# Patient Record
Sex: MALE | Employment: OTHER
[De-identification: names, ages, dates, MRNs, and addresses within clinical notes are randomized per-mention and may not be internally consistent; named-entity substitution may affect disease eponyms.]

---

## 2017-04-04 PROBLEM — M54.2 NECK PAIN: Status: ACTIVE | Noted: 2017-04-04

## 2018-01-08 ENCOUNTER — HOSPITAL ENCOUNTER (OUTPATIENT)
Dept: GENERAL RADIOLOGY | Facility: CLINIC | Age: 73
Discharge: HOME | End: 2018-01-08
Payer: MEDICARE

## 2018-01-08 ENCOUNTER — HOSPITAL ENCOUNTER (OUTPATIENT)
Facility: CLINIC | Age: 73
Discharge: HOME | End: 2018-01-08
Payer: MEDICARE

## 2018-01-08 DIAGNOSIS — S40.011A CONTUSION OF RIGHT SCAPULA, INITIAL ENCOUNTER: ICD-10-CM

## 2018-01-08 PROCEDURE — 73010 X-RAY EXAM OF SHOULDER BLADE: CPT

## 2018-10-25 ENCOUNTER — HOSPITAL ENCOUNTER (OUTPATIENT)
Dept: MRI IMAGING | Facility: CLINIC | Age: 73
Discharge: HOME | End: 2018-10-27
Payer: MEDICARE

## 2018-10-25 DIAGNOSIS — H54.3 VISION LOSS, BILATERAL: ICD-10-CM

## 2018-10-25 LAB — POC CREATININE: 1 MG/DL (ref 0.6–1.4)

## 2018-10-25 PROCEDURE — 70553 MRI BRAIN STEM W/O & W/DYE: CPT

## 2018-10-25 PROCEDURE — 6360000004 HC RX CONTRAST MEDICATION: Performed by: FAMILY MEDICINE

## 2018-10-25 PROCEDURE — 82565 ASSAY OF CREATININE: CPT

## 2018-10-25 PROCEDURE — A9579 GAD-BASE MR CONTRAST NOS,1ML: HCPCS | Performed by: FAMILY MEDICINE

## 2018-10-25 RX ADMIN — GADOTERIDOL 15 ML: 279.3 INJECTION, SOLUTION INTRAVENOUS at 09:33

## 2018-10-31 PROBLEM — I67.2 CEREBRAL ATHEROSCLEROSIS: Status: ACTIVE | Noted: 2018-10-31

## 2018-10-31 PROBLEM — I65.29 STENOSIS OF CAROTID ARTERY: Status: ACTIVE | Noted: 2018-10-31

## 2018-12-07 ENCOUNTER — HOSPITAL ENCOUNTER (OUTPATIENT)
Age: 73
Setting detail: SPECIMEN
Discharge: HOME | End: 2018-12-07
Payer: MEDICARE

## 2018-12-07 DIAGNOSIS — E78.00 HYPERCHOLESTEROLEMIA: ICD-10-CM

## 2018-12-07 LAB
CHOLESTEROL/HDL RATIO: 2.3
CHOLESTEROL: 145 MG/DL
HDLC SERPL-MCNC: 62 MG/DL
LDL CHOLESTEROL: 69 MG/DL (ref 0–130)
TRIGL SERPL-MCNC: 68 MG/DL
VLDLC SERPL CALC-MCNC: NORMAL MG/DL (ref 1–30)

## 2019-07-19 ENCOUNTER — HOSPITAL ENCOUNTER (OUTPATIENT)
Dept: MRI IMAGING | Facility: CLINIC | Age: 74
Discharge: HOME | End: 2019-07-21
Payer: MEDICARE

## 2019-07-19 DIAGNOSIS — M54.32 SCIATICA OF LEFT SIDE: ICD-10-CM

## 2019-07-19 PROCEDURE — 72148 MRI LUMBAR SPINE W/O DYE: CPT

## 2020-09-14 PROBLEM — I79.8 OTHER DISORDERS OF ARTERIES, ARTERIOLES AND CAPILLARIES IN DISEASES CLASSIFIED ELSEWHERE (HCC): Status: ACTIVE | Noted: 2020-09-14

## 2023-04-17 ENCOUNTER — HOSPITAL ENCOUNTER (OUTPATIENT)
Age: 78
Setting detail: SPECIMEN
Discharge: HOME | End: 2023-04-17

## 2023-04-17 DIAGNOSIS — D50.9 IRON DEFICIENCY ANEMIA, UNSPECIFIED IRON DEFICIENCY ANEMIA TYPE: ICD-10-CM

## 2023-04-17 LAB
HCT VFR BLD AUTO: 39.6 % (ref 40.7–50.3)
HGB BLD-MCNC: 12.9 G/DL (ref 13–17)
MCH RBC QN AUTO: 31.2 PG (ref 25.2–33.5)
MCHC RBC AUTO-ENTMCNC: 32.6 G/DL (ref 28.4–34.8)
MCV RBC AUTO: 95.9 FL (ref 82.6–102.9)
NRBC AUTOMATED: 0 PER 100 WBC
PDW BLD-RTO: 14.4 % (ref 11.8–14.4)
PLATELET # BLD AUTO: 248 K/UL (ref 138–453)
PMV BLD AUTO: 9 FL (ref 8.1–13.5)
RBC # BLD: 4.13 M/UL (ref 4.21–5.77)
WBC # BLD AUTO: 5.6 K/UL (ref 3.5–11.3)

## 2023-05-08 PROBLEM — Z85.46 HISTORY OF PROSTATE CANCER: Status: ACTIVE | Noted: 2023-05-08

## 2023-05-08 PROBLEM — N39.42 URINARY INCONTINENCE WITHOUT SENSORY AWARENESS: Status: ACTIVE | Noted: 2023-05-08

## 2023-05-08 PROBLEM — R31.29 MICROHEMATURIA: Status: ACTIVE | Noted: 2023-05-08

## 2023-06-05 ENCOUNTER — PROCEDURE VISIT (OUTPATIENT)
Age: 78
End: 2023-06-05
Payer: MEDICARE

## 2023-06-05 DIAGNOSIS — R31.29 MICROHEMATURIA: ICD-10-CM

## 2023-06-05 DIAGNOSIS — R35.1 NOCTURIA: ICD-10-CM

## 2023-06-05 DIAGNOSIS — Z85.46 HISTORY OF PROSTATE CANCER: ICD-10-CM

## 2023-06-05 DIAGNOSIS — N39.42 URINARY INCONTINENCE WITHOUT SENSORY AWARENESS: Primary | ICD-10-CM

## 2023-06-05 PROCEDURE — 51797 INTRAABDOMINAL PRESSURE TEST: CPT | Performed by: SPECIALIST

## 2023-06-05 PROCEDURE — 51729 CYSTOMETROGRAM W/VP&UP: CPT | Performed by: SPECIALIST

## 2023-06-05 PROCEDURE — 52000 CYSTOURETHROSCOPY: CPT | Performed by: SPECIALIST

## 2023-06-05 PROCEDURE — 51784 ANAL/URINARY MUSCLE STUDY: CPT | Performed by: SPECIALIST

## 2023-06-05 PROCEDURE — 51741 ELECTRO-UROFLOWMETRY FIRST: CPT | Performed by: SPECIALIST

## 2023-06-05 RX ORDER — CEPHALEXIN 500 MG/1
500 CAPSULE ORAL 2 TIMES DAILY
Qty: 2 CAPSULE | Refills: 0 | Status: SHIPPED | OUTPATIENT
Start: 2023-06-05

## 2023-06-05 NOTE — PROGRESS NOTES
Trina Ballard, [address] [facility] Urology Office Progress Note    Patient:  Jayson Miller  YOB: 1945  Date: 6/5/2023    HISTORY OF PRESENT ILLNESS:   The patient is a 69 y.o. male  Patient's voiding diary shows a maximal bladder capacity of 275 mL (normal is 400 mL) and daily urine outputs of 1233 mL a day (normal is 1500 mL/d). Lower urinary tract symptoms: urgency, frequency, and nocturia, 2 times per night   Last AUA Symptom Score (QOL): 11 (4)  Today's AUA Symptom Score (QOL): 8 (5)    Summary of old records:   Urinary incontinence without sensory awareness: 2 mini-pads per day, 6/5/23 cysto/urodynamics: no obvious DO or MARIA L but external sphincter doesn't close; samples of Gemtesa (vibegron) 75 mg po qd; refer to PT  History of prostate cancer s/p 2001 RRP   5/15/23 VD: JQS=723 mL, MHD=9171 mL/d, TV=8 voids/d, IFY=364 mL/void, Nx2.33, NPi=49%, NUP=72 mL/hr     Additional History: none    Procedures Today:   Urodynamic Evaluation (6/5/23)  Straight catheterization for Postvoid Residual: 0 mL  Uroflow Study:  Amount Voided: 65 mL  Time to Max Flow: 15 sec  Maximum Flow Rate: 4.9 mL/sec  Average Flow Rate: 4.1 mL/sec  CMG with Voiding Pressure Study with intraabdominal probe:  First Sensation: 25 mL  Capacity: 277 mL  Interpretation: no detrusor overactivity   VLPP was >133 cm H20 at 250 mL  Leaking with Valsalva/coughing? No  UPP was 75 cm H20  Surface EMG Normal: yes    Cystoscopy Operative Note (6/5/23)  Surgeon: Karla Jack. Mee Ballard MD, FACS  Anesthesia: Urethral 2% Xylocaine   Indications: urinary incontinence without sensory awareness   Position: Dorsal Lithotomy    Findings:   The patient was prepped and draped in the usual sterile fashion. The flexible cystoscope was advanced through the urethra and into the bladder.   The bladder was thoroughly inspected and the following was noted:  Residual Urine: mild  Urethra: normal appearing urethra with no masses,

## 2023-06-27 ENCOUNTER — TELEPHONE (OUTPATIENT)
Age: 78
End: 2023-06-27

## 2023-06-27 NOTE — TELEPHONE ENCOUNTER
PT called into office today with an update, he was last seen Urodynamic Evaluation (6/5/23)  He was given samples of gemtesa and told to call back with results. He tried taking for 3 days only made his symptoms worse.  He did start pelvic floor therapy on 6/12/23 which is helping

## 2023-10-17 PROBLEM — S92.424D CLOSED NONDISPLACED FRACTURE OF DISTAL PHALANX OF RIGHT GREAT TOE WITH ROUTINE HEALING: Status: ACTIVE | Noted: 2022-08-29

## 2023-10-17 PROBLEM — M19.032 PRIMARY OSTEOARTHRITIS OF LEFT WRIST: Status: ACTIVE | Noted: 2023-05-01

## 2023-10-17 PROBLEM — M18.12 ARTHRITIS OF CARPOMETACARPAL (CMC) JOINT OF LEFT THUMB: Status: ACTIVE | Noted: 2023-08-01

## 2023-10-17 PROBLEM — J20.9 ACUTE BRONCHITIS: Status: ACTIVE | Noted: 2023-07-16

## 2023-10-17 PROBLEM — J02.9 VIRAL PHARYNGITIS: Status: ACTIVE | Noted: 2023-07-16

## 2023-10-18 ENCOUNTER — OFFICE VISIT (OUTPATIENT)
Age: 78
End: 2023-10-18
Payer: MEDICARE

## 2023-10-18 ENCOUNTER — HOSPITAL ENCOUNTER (OUTPATIENT)
Age: 78
Setting detail: SPECIMEN
Discharge: HOME | End: 2023-10-18

## 2023-10-18 VITALS — BODY MASS INDEX: 23.77 KG/M2 | HEIGHT: 70 IN | WEIGHT: 166 LBS

## 2023-10-18 DIAGNOSIS — N39.3 MALE STRESS INCONTINENCE: Primary | ICD-10-CM

## 2023-10-18 DIAGNOSIS — R53.83 OTHER FATIGUE: ICD-10-CM

## 2023-10-18 DIAGNOSIS — Z85.46 HISTORY OF PROSTATE CANCER: ICD-10-CM

## 2023-10-18 DIAGNOSIS — E55.9 VITAMIN D DEFICIENCY: ICD-10-CM

## 2023-10-18 DIAGNOSIS — R31.29 MICROHEMATURIA: ICD-10-CM

## 2023-10-18 DIAGNOSIS — E29.1 HYPOGONADISM IN MALE: ICD-10-CM

## 2023-10-18 DIAGNOSIS — E78.00 HYPERCHOLESTEROLEMIA: ICD-10-CM

## 2023-10-18 DIAGNOSIS — R35.1 NOCTURIA: ICD-10-CM

## 2023-10-18 DIAGNOSIS — E16.2 HYPOGLYCEMIA: ICD-10-CM

## 2023-10-18 LAB
25(OH)D3 SERPL-MCNC: 90.4 NG/ML
ALT SERPL-CCNC: 15 U/L (ref 5–41)
ANION GAP SERPL CALCULATED.3IONS-SCNC: 8 MMOL/L (ref 9–17)
AST SERPL-CCNC: 26 U/L
BASOPHILS # BLD: 0.05 K/UL (ref 0–0.2)
BASOPHILS NFR BLD: 1 % (ref 0–2)
BILIRUBIN, POC: NORMAL
BLOOD URINE, POC: NORMAL
BUN SERPL-MCNC: 18 MG/DL (ref 8–23)
CALCIUM SERPL-MCNC: 9.1 MG/DL (ref 8.6–10.4)
CHLORIDE SERPL-SCNC: 104 MMOL/L (ref 98–107)
CHOLEST SERPL-MCNC: 139 MG/DL
CHOLESTEROL/HDL RATIO: 2.2
CLARITY, POC: CLEAR
CO2 SERPL-SCNC: 26 MMOL/L (ref 20–31)
COLOR, POC: YELLOW
CREAT SERPL-MCNC: 1.1 MG/DL (ref 0.7–1.2)
EOSINOPHIL # BLD: 0.3 K/UL (ref 0–0.44)
EOSINOPHILS RELATIVE PERCENT: 5 % (ref 1–4)
ERYTHROCYTE [DISTWIDTH] IN BLOOD BY AUTOMATED COUNT: 14.6 % (ref 11.8–14.4)
EST. AVERAGE GLUCOSE BLD GHB EST-MCNC: 108 MG/DL
FOLATE SERPL-MCNC: >20 NG/ML
GFR SERPL CREATININE-BSD FRML MDRD: >60 ML/MIN/1.73M2
GLUCOSE P FAST SERPL-MCNC: 94 MG/DL (ref 70–99)
GLUCOSE URINE, POC: NORMAL
HBA1C MFR BLD: 5.4 % (ref 4–6)
HCT VFR BLD AUTO: 42.9 % (ref 40.7–50.3)
HDLC SERPL-MCNC: 63 MG/DL
HGB BLD-MCNC: 13.9 G/DL (ref 13–17)
IMM GRANULOCYTES # BLD AUTO: <0.03 K/UL (ref 0–0.3)
IMM GRANULOCYTES NFR BLD: 0 %
KETONES, POC: NORMAL
LDLC SERPL CALC-MCNC: 63 MG/DL (ref 0–130)
LEUKOCYTE EST, POC: NORMAL
LYMPHOCYTES NFR BLD: 2.24 K/UL (ref 1.1–3.7)
LYMPHOCYTES RELATIVE PERCENT: 40 % (ref 24–43)
MCH RBC QN AUTO: 31 PG (ref 25.2–33.5)
MCHC RBC AUTO-ENTMCNC: 32.4 G/DL (ref 28.4–34.8)
MCV RBC AUTO: 95.8 FL (ref 82.6–102.9)
MONOCYTES NFR BLD: 0.59 K/UL (ref 0.1–1.2)
MONOCYTES NFR BLD: 11 % (ref 3–12)
NEUTROPHILS NFR BLD: 43 % (ref 36–65)
NEUTS SEG NFR BLD: 2.35 K/UL (ref 1.5–8.1)
NITRITE, POC: NORMAL
NRBC BLD-RTO: 0 PER 100 WBC
PH, POC: NORMAL
PLATELET # BLD AUTO: 206 K/UL (ref 138–453)
PMV BLD AUTO: 9.4 FL (ref 8.1–13.5)
POTASSIUM SERPL-SCNC: 3.9 MMOL/L (ref 3.7–5.3)
PROTEIN, POC: NORMAL
PSA SERPL-MCNC: <0.02 NG/ML
RBC # BLD AUTO: 4.48 M/UL (ref 4.21–5.77)
RBC # BLD: ABNORMAL 10*6/UL
SHBG SERPL-SCNC: 107 NMOL/L (ref 11–80)
SODIUM SERPL-SCNC: 138 MMOL/L (ref 135–144)
SPECIFIC GRAVITY, POC: NORMAL
T4 FREE SERPL-MCNC: 1.3 NG/DL (ref 0.9–1.7)
TESTOST FREE MFR SERPL: 29.2 PG/ML (ref 47–244)
TESTOST SERPL-MCNC: 352 NG/DL (ref 220–1000)
TRIGL SERPL-MCNC: 67 MG/DL
TSH SERPL DL<=0.05 MIU/L-ACNC: 0.82 UIU/ML (ref 0.3–5)
UROBILINOGEN, POC: NORMAL
VIT B12 SERPL-MCNC: 1260 PG/ML (ref 232–1245)
WBC OTHER # BLD: 5.5 K/UL (ref 3.5–11.3)

## 2023-10-18 PROCEDURE — 99214 OFFICE O/P EST MOD 30 MIN: CPT | Performed by: SPECIALIST

## 2023-10-18 PROCEDURE — G8420 CALC BMI NORM PARAMETERS: HCPCS | Performed by: SPECIALIST

## 2023-10-18 PROCEDURE — 1036F TOBACCO NON-USER: CPT | Performed by: SPECIALIST

## 2023-10-18 PROCEDURE — G8427 DOCREV CUR MEDS BY ELIG CLIN: HCPCS | Performed by: SPECIALIST

## 2023-10-18 PROCEDURE — 1123F ACP DISCUSS/DSCN MKR DOCD: CPT | Performed by: SPECIALIST

## 2023-10-18 PROCEDURE — 81003 URINALYSIS AUTO W/O SCOPE: CPT | Performed by: SPECIALIST

## 2023-10-18 PROCEDURE — G8484 FLU IMMUNIZE NO ADMIN: HCPCS | Performed by: SPECIALIST

## 2023-10-24 ENCOUNTER — HOSPITAL ENCOUNTER (OUTPATIENT)
Dept: VASCULAR LAB | Age: 78
Discharge: HOME | End: 2023-10-26
Payer: MEDICARE

## 2023-10-24 DIAGNOSIS — I65.29 STENOSIS OF CAROTID ARTERY, UNSPECIFIED LATERALITY: ICD-10-CM

## 2023-10-24 DIAGNOSIS — I79.8 OTHER DISORDERS OF ARTERIES, ARTERIOLES AND CAPILLARIES IN DISEASES CLASSIFIED ELSEWHERE (HCC): ICD-10-CM

## 2023-10-24 LAB
VAS LEFT BULB EDV: 16.4 CM/S
VAS LEFT BULB PSV: 63.1 CM/S
VAS LEFT CCA DIST EDV: 16.4 CM/S
VAS LEFT CCA DIST PSV: 81.1 CM/S
VAS LEFT CCA PROX EDV: 21.6 CM/S
VAS LEFT CCA PROX PSV: 92.8 CM/S
VAS LEFT ECA EDV: 12.51 CM/S
VAS LEFT ECA PSV: 60.5 CM/S
VAS LEFT ICA DIST EDV: 34.7 CM/S
VAS LEFT ICA DIST PSV: 108.8 CM/S
VAS LEFT ICA PROX EDV: 21.6 CM/S
VAS LEFT ICA PROX PSV: 82.7 CM/S
VAS LEFT ICA/CCA PSV: 1.34 NO UNITS
VAS LEFT VERTEBRAL EDV: 9.95 CM/S
VAS LEFT VERTEBRAL PSV: 38.6 CM/S
VAS RIGHT BULB EDV: 18.5 CM/S
VAS RIGHT BULB PSV: 74.3 CM/S
VAS RIGHT CCA DIST EDV: 14.6 CM/S
VAS RIGHT CCA DIST PSV: 87.2 CM/S
VAS RIGHT CCA PROX EDV: 19.8 CM/S
VAS RIGHT CCA PROX PSV: 123.4 CM/S
VAS RIGHT ICA DIST EDV: 27.7 CM/S
VAS RIGHT ICA DIST PSV: 94 CM/S
VAS RIGHT ICA PROX EDV: 16 CM/S
VAS RIGHT ICA PROX PSV: 81 CM/S
VAS RIGHT ICA/CCA PSV: 1.1 NO UNITS
VAS RIGHT VERTEBRAL EDV: 6.93 CM/S
VAS RIGHT VERTEBRAL PSV: 41 CM/S

## 2023-10-24 PROCEDURE — 93880 EXTRACRANIAL BILAT STUDY: CPT

## 2023-10-24 PROCEDURE — 93880 EXTRACRANIAL BILAT STUDY: CPT | Performed by: SURGERY

## 2023-11-07 DIAGNOSIS — R35.1 NOCTURIA: ICD-10-CM

## 2023-11-07 DIAGNOSIS — N39.3 MALE STRESS INCONTINENCE: Primary | ICD-10-CM

## 2023-11-07 DIAGNOSIS — Z85.46 HISTORY OF PROSTATE CANCER: ICD-10-CM

## 2023-11-07 DIAGNOSIS — R31.29 MICROHEMATURIA: ICD-10-CM

## 2024-02-26 ENCOUNTER — HOSPITAL ENCOUNTER (OUTPATIENT)
Age: 79
Setting detail: SPECIMEN
Discharge: HOME | End: 2024-02-26

## 2024-02-26 DIAGNOSIS — E34.8 EXCESS ESTROGEN IN MALE: ICD-10-CM

## 2024-02-26 LAB — ESTRADIOL LEVEL: 10 PG/ML (ref 27–52)

## 2024-03-21 ENCOUNTER — HOSPITAL ENCOUNTER (OUTPATIENT)
Age: 79
Setting detail: SPECIMEN
Discharge: HOME | End: 2024-03-21

## 2024-03-21 DIAGNOSIS — E29.1 HYPOGONADISM MALE: ICD-10-CM

## 2024-03-21 DIAGNOSIS — R35.1 NOCTURIA: ICD-10-CM

## 2024-03-21 LAB
ALT SERPL-CCNC: 17 U/L (ref 10–50)
AST SERPL-CCNC: 30 U/L (ref 10–50)
BASOPHILS # BLD: 0.07 K/UL (ref 0–0.2)
BASOPHILS NFR BLD: 1 % (ref 0–2)
EOSINOPHIL # BLD: 0.25 K/UL (ref 0–0.44)
EOSINOPHILS RELATIVE PERCENT: 3 % (ref 1–4)
ERYTHROCYTE [DISTWIDTH] IN BLOOD BY AUTOMATED COUNT: 14.6 % (ref 11.8–14.4)
ESTRADIOL LEVEL: 44 PG/ML (ref 27–52)
HCT VFR BLD AUTO: 42.7 % (ref 40.7–50.3)
HGB BLD-MCNC: 13.9 G/DL (ref 13–17)
IMM GRANULOCYTES # BLD AUTO: <0.03 K/UL (ref 0–0.3)
IMM GRANULOCYTES NFR BLD: 0 %
LYMPHOCYTES NFR BLD: 3.12 K/UL (ref 1.1–3.7)
LYMPHOCYTES RELATIVE PERCENT: 43 % (ref 24–43)
MCH RBC QN AUTO: 29.3 PG (ref 25.2–33.5)
MCHC RBC AUTO-ENTMCNC: 32.6 G/DL (ref 28.4–34.8)
MCV RBC AUTO: 90.1 FL (ref 82.6–102.9)
MONOCYTES NFR BLD: 0.96 K/UL (ref 0.1–1.2)
MONOCYTES NFR BLD: 13 % (ref 3–12)
NEUTROPHILS NFR BLD: 40 % (ref 36–65)
NEUTS SEG NFR BLD: 2.88 K/UL (ref 1.5–8.1)
NRBC BLD-RTO: 0 PER 100 WBC
PLATELET # BLD AUTO: 257 K/UL (ref 138–453)
PMV BLD AUTO: 9 FL (ref 8.1–13.5)
PSA SERPL-MCNC: <0.03 NG/ML (ref 0–4)
RBC # BLD AUTO: 4.74 M/UL (ref 4.21–5.77)
RBC # BLD: ABNORMAL 10*6/UL
SHBG SERPL-SCNC: 81 NMOL/L (ref 19–76)
TESTOST FREE MFR SERPL: ABNORMAL PG/ML (ref 47–244)
TESTOST SERPL-MCNC: >1500 NG/DL (ref 193–740)
WBC OTHER # BLD: 7.3 K/UL (ref 3.5–11.3)

## 2024-06-03 ENCOUNTER — OFFICE VISIT (OUTPATIENT)
Age: 79
End: 2024-06-03
Payer: MEDICARE

## 2024-06-03 VITALS — WEIGHT: 165 LBS | BODY MASS INDEX: 23.62 KG/M2 | HEIGHT: 70 IN

## 2024-06-03 DIAGNOSIS — E29.1 HYPOGONADISM IN MALE: ICD-10-CM

## 2024-06-03 DIAGNOSIS — R31.29 MICROHEMATURIA: ICD-10-CM

## 2024-06-03 DIAGNOSIS — Z85.46 HISTORY OF PROSTATE CANCER: ICD-10-CM

## 2024-06-03 DIAGNOSIS — N39.3 MALE STRESS INCONTINENCE: Primary | ICD-10-CM

## 2024-06-03 PROCEDURE — G8420 CALC BMI NORM PARAMETERS: HCPCS | Performed by: SPECIALIST

## 2024-06-03 PROCEDURE — 99214 OFFICE O/P EST MOD 30 MIN: CPT | Performed by: SPECIALIST

## 2024-06-03 PROCEDURE — 1036F TOBACCO NON-USER: CPT | Performed by: SPECIALIST

## 2024-06-03 PROCEDURE — G8428 CUR MEDS NOT DOCUMENT: HCPCS | Performed by: SPECIALIST

## 2024-06-03 PROCEDURE — 1123F ACP DISCUSS/DSCN MKR DOCD: CPT | Performed by: SPECIALIST

## 2024-06-03 RX ORDER — KETOROLAC TROMETHAMINE 5 MG/ML
SOLUTION OPHTHALMIC
COMMUNITY

## 2024-06-03 RX ORDER — MAGNESIUM GLUCONATE 30 MG(550)
100 TABLET ORAL
COMMUNITY

## 2024-06-03 RX ORDER — TOBRAMYCIN AND DEXAMETHASONE 3; 1 MG/ML; MG/ML
SUSPENSION/ DROPS OPHTHALMIC
COMMUNITY
Start: 2024-04-02

## 2024-06-03 NOTE — PROGRESS NOTES
Alberto Conteh MD FACS    [facility] Urology Office Progress Note    Patient:  Laz Kurtz  YOB: 1945  Date: 6/3/2024    HISTORY OF PRESENT ILLNESS:   The patient is a 79 y.o. male  Patient's Stress urinary incontinence is much improved and nearly resolved after E-stim pelvic floor muscle therapy.    No evidence of prostate cancer recurrence s/p 2001 radical prostatectomy since PSA is 0.03.   Patient getting Depotestosterone injections for male hypogonadism and feels better with this Rx.  Lower urinary tract symptoms: urgency, frequency, decreased urinary stream, nocturia, 2 times per night, and incomplete emptying.   Last AUA Symptom Score (QOL): 8 (6)  Today's AUA Symptom Score (QOL): 7 (2)    Summary of old records:   Urinary incontinence without sensory awareness: 0 mini-pads per day, 6/5/23 cysto/urodynamics: no obvious DO or MARIA L but external sphincter doesn't close; Gemtesa (vibegron) 75 mg po qd helped his frequency; saw PT; referred to Dr. Haynes's PTs for E-stim  History of prostate cancer s/p 2001 RRP   5/15/23 VD: TET=989 mL, URR=7448 mL/d, TV=8 voids/d, KGO=445 mL/void, Nx2.33, NPi=49%, NUP=72 mL/hr  Microhematuria, small on 10/18/23 UA     Additional History: none    Procedures Today: N/A    Urinalysis today:  No results found for this visit on 06/03/24.    Last several PSA's:  Lab Results   Component Value Date    PSA <0.03 03/21/2024    PSA <0.02 10/18/2023    PSA 0.06 04/14/2022       Last total testosterone:  Lab Results   Component Value Date    TESTOSTERONE >1,500 (H) 03/21/2024       Last BUN and creatinine:  Lab Results   Component Value Date    BUN 18 10/18/2023     Lab Results   Component Value Date    CREATININE 1.1 10/18/2023       Last CBC:  Lab Results   Component Value Date    WBC 7.3 03/21/2024    HGB 13.9 03/21/2024    HCT 42.7 03/21/2024    MCV 90.1 03/21/2024     03/21/2024       Additional Lab/Culture results: none    Imaging Reviewed

## 2024-06-18 PROBLEM — F33.0 MAJOR DEPRESSIVE DISORDER, RECURRENT, MILD (HCC): Status: ACTIVE | Noted: 2024-06-18

## 2024-06-18 PROBLEM — F33.9 MAJOR DEPRESSIVE DISORDER, RECURRENT, UNSPECIFIED (HCC): Status: ACTIVE | Noted: 2024-06-18

## 2024-06-18 PROBLEM — F33.1 MAJOR DEPRESSIVE DISORDER, RECURRENT, MODERATE (HCC): Status: ACTIVE | Noted: 2024-06-18

## 2024-11-06 PROBLEM — F33.1 MAJOR DEPRESSIVE DISORDER, RECURRENT, MODERATE (HCC): Status: RESOLVED | Noted: 2024-06-18 | Resolved: 2024-11-06

## 2024-11-22 ENCOUNTER — HOSPITAL ENCOUNTER (OUTPATIENT)
Dept: GENERAL RADIOLOGY | Facility: CLINIC | Age: 79
Discharge: HOME | End: 2024-11-22
Payer: MEDICARE

## 2024-11-22 DIAGNOSIS — R05.2 SUBACUTE COUGH: ICD-10-CM

## 2024-11-22 PROCEDURE — 71046 X-RAY EXAM CHEST 2 VIEWS: CPT

## 2025-02-04 ENCOUNTER — HOSPITAL ENCOUNTER (OUTPATIENT)
Age: 80
Setting detail: SPECIMEN
Discharge: HOME | End: 2025-02-04

## 2025-02-04 DIAGNOSIS — R35.1 NOCTURIA: ICD-10-CM

## 2025-02-04 DIAGNOSIS — E29.1 HYPOGONADISM MALE: ICD-10-CM

## 2025-02-04 LAB
ALT SERPL-CCNC: 19 U/L (ref 10–50)
AST SERPL-CCNC: 35 U/L (ref 10–50)
BASOPHILS # BLD: 0.07 K/UL (ref 0–0.2)
BASOPHILS NFR BLD: 1 % (ref 0–2)
EOSINOPHIL # BLD: 0.21 K/UL (ref 0–0.44)
EOSINOPHILS RELATIVE PERCENT: 3 % (ref 1–4)
ERYTHROCYTE [DISTWIDTH] IN BLOOD BY AUTOMATED COUNT: 17.1 % (ref 11.8–14.4)
ESTRADIOL LEVEL: 12.9 PG/ML (ref 27–52)
HCT VFR BLD AUTO: 41.2 % (ref 40.7–50.3)
HGB BLD-MCNC: 12.9 G/DL (ref 13–17)
IMM GRANULOCYTES # BLD AUTO: <0.03 K/UL (ref 0–0.3)
IMM GRANULOCYTES NFR BLD: 0 %
LYMPHOCYTES NFR BLD: 3.06 K/UL (ref 1.1–3.7)
LYMPHOCYTES RELATIVE PERCENT: 51 % (ref 24–43)
MCH RBC QN AUTO: 27 PG (ref 25.2–33.5)
MCHC RBC AUTO-ENTMCNC: 31.3 G/DL (ref 28.4–34.8)
MCV RBC AUTO: 86.2 FL (ref 82.6–102.9)
MONOCYTES NFR BLD: 0.75 K/UL (ref 0.1–1.2)
MONOCYTES NFR BLD: 12 % (ref 3–12)
NEUTROPHILS NFR BLD: 33 % (ref 36–65)
NEUTS SEG NFR BLD: 2.02 K/UL (ref 1.5–8.1)
NRBC BLD-RTO: 0 PER 100 WBC
PLATELET # BLD AUTO: 274 K/UL (ref 138–453)
PMV BLD AUTO: 9.3 FL (ref 8.1–13.5)
PSA SERPL-MCNC: <0.03 NG/ML (ref 0–4)
RBC # BLD AUTO: 4.78 M/UL (ref 4.21–5.77)
RBC # BLD: ABNORMAL 10*6/UL
SHBG SERPL-SCNC: 98 NMOL/L (ref 19–76)
TESTOST FREE MFR SERPL: 89.2 PG/ML (ref 47–244)
TESTOST SERPL-MCNC: 877 NG/DL (ref 193–740)
WBC OTHER # BLD: 6.1 K/UL (ref 3.5–11.3)

## 2025-03-14 ENCOUNTER — APPOINTMENT (OUTPATIENT)
Dept: GENERAL RADIOLOGY | Facility: CLINIC | Age: 80
End: 2025-03-14
Payer: MEDICARE

## 2025-03-14 ENCOUNTER — HOSPITAL ENCOUNTER (EMERGENCY)
Facility: CLINIC | Age: 80
Discharge: HOME | End: 2025-03-14
Attending: STUDENT IN AN ORGANIZED HEALTH CARE EDUCATION/TRAINING PROGRAM
Payer: MEDICARE

## 2025-03-14 VITALS
SYSTOLIC BLOOD PRESSURE: 96 MMHG | TEMPERATURE: 98.2 F | WEIGHT: 155 LBS | BODY MASS INDEX: 22.19 KG/M2 | HEART RATE: 79 BPM | DIASTOLIC BLOOD PRESSURE: 59 MMHG | HEIGHT: 70 IN | OXYGEN SATURATION: 99 % | RESPIRATION RATE: 17 BRPM

## 2025-03-14 DIAGNOSIS — I48.91 NEW ONSET ATRIAL FIBRILLATION (HCC): Primary | ICD-10-CM

## 2025-03-14 LAB
ALBUMIN SERPL-MCNC: 3.9 G/DL (ref 3.5–5.2)
ALBUMIN/GLOB SERPL: 1.9 {RATIO}
ALP SERPL-CCNC: 57 U/L (ref 40–129)
ALT SERPL-CCNC: 16 U/L (ref 10–50)
ANION GAP SERPL CALCULATED.3IONS-SCNC: 9 MMOL/L (ref 9–16)
AST SERPL-CCNC: 36 U/L (ref 10–50)
BASOPHILS # BLD: 0 K/UL (ref 0–0.2)
BASOPHILS NFR BLD: 1 % (ref 0–2)
BILIRUB SERPL-MCNC: 0.8 MG/DL (ref 0–1.2)
BNP SERPL-MCNC: 454 PG/ML (ref 0–300)
BUN SERPL-MCNC: 11 MG/DL (ref 8–23)
CALCIUM SERPL-MCNC: 9 MG/DL (ref 8.6–10.4)
CHLORIDE SERPL-SCNC: 101 MMOL/L (ref 98–107)
CO2 SERPL-SCNC: 25 MMOL/L (ref 20–31)
CREAT SERPL-MCNC: 1.1 MG/DL (ref 0.7–1.2)
D DIMER PPP FEU-MCNC: 0.59 UG/ML FEU
EOSINOPHIL # BLD: 0.1 K/UL (ref 0–0.4)
EOSINOPHILS RELATIVE PERCENT: 3 % (ref 1–4)
ERYTHROCYTE [DISTWIDTH] IN BLOOD BY AUTOMATED COUNT: 18.3 % (ref 12.5–15.4)
GFR, ESTIMATED: 69 ML/MIN/1.73M2
GLUCOSE SERPL-MCNC: 157 MG/DL (ref 74–99)
HCT VFR BLD AUTO: 38.5 % (ref 41–53)
HGB BLD-MCNC: 13 G/DL (ref 13.5–17.5)
LYMPHOCYTES NFR BLD: 2 K/UL (ref 1–4.8)
LYMPHOCYTES RELATIVE PERCENT: 39 % (ref 24–44)
MAGNESIUM SERPL-MCNC: 2 MG/DL (ref 1.6–2.4)
MCH RBC QN AUTO: 28.7 PG (ref 26–34)
MCHC RBC AUTO-ENTMCNC: 33.8 G/DL (ref 31–37)
MCV RBC AUTO: 85 FL (ref 80–100)
MONOCYTES NFR BLD: 0.5 K/UL (ref 0.1–1.2)
MONOCYTES NFR BLD: 9 % (ref 2–11)
NEUTROPHILS NFR BLD: 48 % (ref 36–66)
NEUTS SEG NFR BLD: 2.5 K/UL (ref 1.8–7.7)
PLATELET # BLD AUTO: 246 K/UL (ref 140–450)
PMV BLD AUTO: 6.4 FL (ref 6–12)
POTASSIUM SERPL-SCNC: 4.3 MMOL/L (ref 3.7–5.3)
PROT SERPL-MCNC: 6 G/DL (ref 6.6–8.7)
RBC # BLD AUTO: 4.53 M/UL (ref 4.5–5.9)
SODIUM SERPL-SCNC: 135 MMOL/L (ref 136–145)
TROPONIN I SERPL HS-MCNC: 18 NG/L (ref 0–22)
TROPONIN I SERPL HS-MCNC: 19 NG/L (ref 0–22)
WBC OTHER # BLD: 5.1 K/UL (ref 3.5–11)

## 2025-03-14 PROCEDURE — 85379 FIBRIN DEGRADATION QUANT: CPT

## 2025-03-14 PROCEDURE — 96360 HYDRATION IV INFUSION INIT: CPT

## 2025-03-14 PROCEDURE — 83880 ASSAY OF NATRIURETIC PEPTIDE: CPT

## 2025-03-14 PROCEDURE — 84484 ASSAY OF TROPONIN QUANT: CPT

## 2025-03-14 PROCEDURE — 83735 ASSAY OF MAGNESIUM: CPT

## 2025-03-14 PROCEDURE — 36415 COLL VENOUS BLD VENIPUNCTURE: CPT

## 2025-03-14 PROCEDURE — 80053 COMPREHEN METABOLIC PANEL: CPT

## 2025-03-14 PROCEDURE — 71045 X-RAY EXAM CHEST 1 VIEW: CPT

## 2025-03-14 PROCEDURE — 99285 EMERGENCY DEPT VISIT HI MDM: CPT

## 2025-03-14 PROCEDURE — 2580000003 HC RX 258: Performed by: STUDENT IN AN ORGANIZED HEALTH CARE EDUCATION/TRAINING PROGRAM

## 2025-03-14 PROCEDURE — 85025 COMPLETE CBC W/AUTO DIFF WBC: CPT

## 2025-03-14 RX ORDER — METOPROLOL SUCCINATE 25 MG/1
25 TABLET, EXTENDED RELEASE ORAL DAILY
Qty: 30 TABLET | Refills: 0 | Status: SHIPPED | OUTPATIENT
Start: 2025-03-14 | End: 2025-04-13

## 2025-03-14 RX ORDER — 0.9 % SODIUM CHLORIDE 0.9 %
1000 INTRAVENOUS SOLUTION INTRAVENOUS ONCE
Status: COMPLETED | OUTPATIENT
Start: 2025-03-14 | End: 2025-03-14

## 2025-03-14 RX ADMIN — SODIUM CHLORIDE 1000 ML: 9 INJECTION, SOLUTION INTRAVENOUS at 08:53

## 2025-03-14 ASSESSMENT — PAIN - FUNCTIONAL ASSESSMENT
PAIN_FUNCTIONAL_ASSESSMENT: NONE - DENIES PAIN

## 2025-03-14 ASSESSMENT — ENCOUNTER SYMPTOMS
VOMITING: 0
ABDOMINAL PAIN: 0
COUGH: 0
SHORTNESS OF BREATH: 0
NAUSEA: 0

## 2025-03-14 NOTE — DISCHARGE INSTRUCTIONS
We evaluated you for your symptoms.  We found that you were in a new heart rhythm called atrial fibrillation.  We discussed this with cardiology.  Stop taking your lisinopril and start taking metoprolol succinate 25 mg daily.  Additionally stop taking aspirin every day and start taking Eliquis 5 mg twice daily.  This is a blood thinner.  You need to monitor for any signs of bleeding.  If you have any trauma or falls you do need to be evaluated as you are at a higher bleeding risk.    Please follow close with your primary doctor.    Please follow-up with the emergency cardiology clinic as soon as possible, call them today and discussed that you need an appointment.    Return to the emergency department if you develop any worsening or concerning symptoms.

## 2025-03-14 NOTE — ED PROVIDER NOTES
6.0 (*)     All other components within normal limits   BRAIN NATRIURETIC PEPTIDE - Abnormal; Notable for the following components:    NT Pro- (*)     All other components within normal limits   TROPONIN   MAGNESIUM   D-DIMER, QUANTITATIVE   TROPONIN       All other labs were within normal range or not returned as of this dictation.    RADIOLOGY:  XR CHEST PORTABLE   Final Result   1.  No acute abnormality.             EKG:  EKG showing A-fib, rate controlled 85, normal axis, no acute ST or T wave changes, abnormal EKG, no previous EKGs to compare to      ED COURSE     ED Course as of 03/14/25 1129   Fri Mar 14, 2025   0914 WBC: 5.1 [AB]   0914 Hemoglobin Quant(!): 13.0 [AB]   0915 D-Dimer, Quant: 0.59  Negative [AB]   0935 Creatinine: 1.1 [AB]   0935 Magnesium: 2.0 [AB]   0935 Troponin, High Sensitivity: 18  No priors will trend [AB]   0935 NT Pro-BNP(!): 454 [AB]   1003 XR CHEST PORTABLE  IMPRESSION:  1.  No acute abnormality.      [AB]   1008 Troponin, High Sensitivity: 19  Stable [AB]   1009 Patient remains rate controlled, will discuss with cardiology [AB]   1039 Patient reevaluated, resting comfortably, heart rate in the 70s, still in A-fib.  Updated on workup here thus far.  Still awaiting recommendations from cardiology. [AB]   1107 Did speak with Dr. Moya with cardiology.  Recommending that patient stop his lisinopril and switch to metoprolol succinate 25 mg daily.  Additionally would like patient to stop aspirin and start taking Eliquis 5 mg twice daily.  Would like patient to follow-up in the emergency health cardiology clinic.  Discussed this with patient, in agreement.  Will discharge at this time with close outpatient follow-up with cardiology as well as primary doctor.  Given strict return precautions.  Patient expressed understanding, agreed with plan. [AB]      ED Course User Index  [AB] Neli Thornton DO       CONSULTS:  IP CONSULT TO CARDIOLOGY    PROCEDURES:  Procedures    Critical

## 2025-03-16 LAB
EKG ATRIAL RATE: 69 BPM
EKG Q-T INTERVAL: 332 MS
EKG QRS DURATION: 82 MS
EKG QTC CALCULATION (BAZETT): 395 MS
EKG R AXIS: 63 DEGREES
EKG T AXIS: 62 DEGREES
EKG VENTRICULAR RATE: 85 BPM

## 2025-03-17 ENCOUNTER — CARE COORDINATION (OUTPATIENT)
Dept: CARE COORDINATION | Age: 80
End: 2025-03-17

## 2025-03-17 NOTE — CARE COORDINATION
Follow Up:   Plan for next ACM outreach in approximately 1 week to complete:  - outreach attempt to offer care management services.   Patient  is agreeable to this plan.

## 2025-03-26 ENCOUNTER — CARE COORDINATION (OUTPATIENT)
Dept: CARE COORDINATION | Age: 80
End: 2025-03-26

## 2025-03-26 NOTE — CARE COORDINATION
Ambulatory Care Coordination Note     3/26/2025 1:51 PM     Patient Current Location:  Home: [address] [place] [address] [place] [address]     ACM contacted the patient by telephone. Verified name and  with patient as identifiers.      ACM: Calvin Gabriel RN     Challenges to be reviewed by the provider   Additional needs identified to be addressed with provider No  none               Method of communication with provider: staff message.    Utilization: Has the patient been seen in the ED since your last call? no    Care Summary Note:     Writer phoned Patient for an update.  Patient states he is feeling well.  He denies chest pain/palpitations and shortness of breath.  He denies symptoms today.  Patient states he is waiting for his appointment with the Cardiologist for further recommendations.  He states he has all of his medications and is taking them as needed.  He is aware of all of his upcoming appointments.      Patient denies need for Writer's assistance at this time.  He states he has Writer's phone number if he has future questions or concerns.       PCP/Specialist follow up:   Future Appointments         Provider Specialty Dept Phone    2025 8:00 AM Vicente Bucio MD Family Medicine [phone]    2025 8:15 AM Debby Moya MD Cardiology [phone]    4/3/2025 3:00 PM Vicente Bucio MD Family Medicine [phone]    4/10/2025 8:10 AM Alberto Conteh MD Urology [phone]    2025 8:00 AM Vicente Bucio MD Family Medicine [phone]    2025 8:00 AM Vicente Bucio MD Family Medicine [phone]            Follow Up:   No further Ambulatory Care Management follow-up scheduled at this time.  Patient  has Ambulatory Care Manager's contact information for any further questions, concerns or needs.

## 2025-04-02 ENCOUNTER — OFFICE VISIT (OUTPATIENT)
Age: 80
End: 2025-04-02
Payer: MEDICARE

## 2025-04-02 VITALS
SYSTOLIC BLOOD PRESSURE: 166 MMHG | HEART RATE: 72 BPM | OXYGEN SATURATION: 97 % | WEIGHT: 153 LBS | BODY MASS INDEX: 21.95 KG/M2 | DIASTOLIC BLOOD PRESSURE: 89 MMHG

## 2025-04-02 DIAGNOSIS — I10 PRIMARY HYPERTENSION: ICD-10-CM

## 2025-04-02 DIAGNOSIS — I20.9 ANGINA PECTORIS: ICD-10-CM

## 2025-04-02 DIAGNOSIS — I48.0 PAROXYSMAL ATRIAL FIBRILLATION (HCC): Primary | ICD-10-CM

## 2025-04-02 DIAGNOSIS — E78.5 DYSLIPIDEMIA: ICD-10-CM

## 2025-04-02 DIAGNOSIS — R07.9 CHEST PAIN, UNSPECIFIED TYPE: ICD-10-CM

## 2025-04-02 DIAGNOSIS — I42.9 CARDIOMYOPATHY, UNSPECIFIED TYPE (HCC): ICD-10-CM

## 2025-04-02 DIAGNOSIS — R07.89 CHEST DISCOMFORT: ICD-10-CM

## 2025-04-02 PROCEDURE — 93000 ELECTROCARDIOGRAM COMPLETE: CPT | Performed by: INTERNAL MEDICINE

## 2025-04-02 PROCEDURE — 1036F TOBACCO NON-USER: CPT | Performed by: INTERNAL MEDICINE

## 2025-04-02 PROCEDURE — 3079F DIAST BP 80-89 MM HG: CPT | Performed by: INTERNAL MEDICINE

## 2025-04-02 PROCEDURE — G8427 DOCREV CUR MEDS BY ELIG CLIN: HCPCS | Performed by: INTERNAL MEDICINE

## 2025-04-02 PROCEDURE — 1123F ACP DISCUSS/DSCN MKR DOCD: CPT | Performed by: INTERNAL MEDICINE

## 2025-04-02 PROCEDURE — G8420 CALC BMI NORM PARAMETERS: HCPCS | Performed by: INTERNAL MEDICINE

## 2025-04-02 PROCEDURE — 3077F SYST BP >= 140 MM HG: CPT | Performed by: INTERNAL MEDICINE

## 2025-04-02 PROCEDURE — 1159F MED LIST DOCD IN RCRD: CPT | Performed by: INTERNAL MEDICINE

## 2025-04-02 PROCEDURE — 1160F RVW MEDS BY RX/DR IN RCRD: CPT | Performed by: INTERNAL MEDICINE

## 2025-04-02 PROCEDURE — 99205 OFFICE O/P NEW HI 60 MIN: CPT | Performed by: INTERNAL MEDICINE

## 2025-04-02 RX ORDER — REGADENOSON 0.08 MG/ML
0.4 INJECTION, SOLUTION INTRAVENOUS
OUTPATIENT
Start: 2025-04-02

## 2025-04-02 NOTE — PROGRESS NOTES
Cardiology Office Consultation           CC: Patient is here to establish cardiac care for chest discomfort and atrial fibrillation.    HPI:  This is a 79-year-old gentleman with history of dyslipidemia, essential hypertension, prostate cancer, iron deficiency anemia, generalized osteoarthritis, depression/anxiety and GERD presented to emergency room with complains of headache, chest discomfort and palpitations that woke him up at 3 AM.  EKG showed atrial fibrillation and he was started on metoprolol for heart rate control and apixaban for anticoagulation and is referred to cardiology for further workup and management.    EKG today shows that he is back in sinus rhythm.  We have discussed the importance of rate control and anticoagulation with atrial fibrillation.  We have requested patient to keep the logbook of blood pressure and heart rate.  We also advised precaution with fall etc. as he is now on a blood thinner.    He underwent echocardiogram in 2018 which showed EF 45% however stress test was unremarkable.  Since patient has chest discomfort and he has mild carotid artery disease suggesting he may have coronary artery disease as well therefore we will perform Lexiscan nuclear stress test and obtain an echocardiogram.    He denies any nicotine, alcohol or drug abuse.  He states that he does not have COPD either.  His father has history of strokes x 2.    Past Medical:  Past Medical History:   Diagnosis Date    Anxiety     Atrial fibrillation (HCC)     Cancer (HCC)     Prostate    COPD (chronic obstructive pulmonary disease) (HCC) 05/11/2015    Hyperlipidemia     Hypertension        Past Surgical:  Past Surgical History:   Procedure Laterality Date    COLONOSCOPY  2010, 2/22/16    LUMBAR FUSION  02/21/2023    L4-5    PROSTATE SURGERY  01/01/2001       Family History:  Family History   Problem Relation Age of Onset    Heart Disease Mother     Heart Disease Father     Stroke Father        Social

## 2025-04-10 ENCOUNTER — OFFICE VISIT (OUTPATIENT)
Age: 80
End: 2025-04-10
Payer: MEDICARE

## 2025-04-10 VITALS — WEIGHT: 153 LBS | BODY MASS INDEX: 21.9 KG/M2 | HEIGHT: 70 IN

## 2025-04-10 DIAGNOSIS — R35.0 FREQUENCY OF MICTURITION: ICD-10-CM

## 2025-04-10 DIAGNOSIS — Z85.46 HISTORY OF PROSTATE CANCER: ICD-10-CM

## 2025-04-10 DIAGNOSIS — N39.3 MALE STRESS INCONTINENCE: Primary | ICD-10-CM

## 2025-04-10 DIAGNOSIS — E29.1 HYPOGONADISM IN MALE: ICD-10-CM

## 2025-04-10 DIAGNOSIS — R31.29 MICROHEMATURIA: ICD-10-CM

## 2025-04-10 LAB
BILIRUBIN, POC: NORMAL
BLOOD URINE, POC: NORMAL
CLARITY, POC: CLEAR
COLOR, POC: YELLOW
GLUCOSE URINE, POC: NORMAL MG/DL
KETONES, POC: NORMAL
LEUKOCYTE EST, POC: NORMAL
NITRITE, POC: NORMAL
PH, POC: NORMAL
PROTEIN, POC: NORMAL MG/DL
SPECIFIC GRAVITY, POC: NORMAL
UROBILINOGEN, POC: NORMAL

## 2025-04-10 PROCEDURE — 81003 URINALYSIS AUTO W/O SCOPE: CPT | Performed by: SPECIALIST

## 2025-04-10 PROCEDURE — 1123F ACP DISCUSS/DSCN MKR DOCD: CPT | Performed by: SPECIALIST

## 2025-04-10 PROCEDURE — 99214 OFFICE O/P EST MOD 30 MIN: CPT | Performed by: SPECIALIST

## 2025-04-10 PROCEDURE — G8420 CALC BMI NORM PARAMETERS: HCPCS | Performed by: SPECIALIST

## 2025-04-10 PROCEDURE — G8428 CUR MEDS NOT DOCUMENT: HCPCS | Performed by: SPECIALIST

## 2025-04-10 PROCEDURE — 1036F TOBACCO NON-USER: CPT | Performed by: SPECIALIST

## 2025-04-10 NOTE — PROGRESS NOTES
Alberto Conteh MD FACS    [facility] Urology Office Progress Note    Patient:  Laz Kurtz  YOB: 1945  Date: 4/10/2025    HISTORY OF PRESENT ILLNESS:   The patient is a 79 y.o. male  No evidence of prostate cancer recurrence s/p 2001 open radical prostatectomy since PSA is <0.03.  Will repeat a PSA in 12 months.   Patient's Stress urinary incontinence is much better after seeing PT for pelvic floor muscle rehabilitation.  Patient getting Testosterone cypionate IM injections every 3 weeks for Testosterone deficiency.  Patient saw improvement in his urgency and frequency with Gemtesa (vibegron) 75 mg po qd for OAB.  Will send to [facility] pharmacy to see if patient qualifies for a free year's supply.   If not, will try Solifenacin (Vesicare) 5 mg po qd for OAB symptoms      Lower urinary tract symptoms: urgency, frequency, hesitancy, nocturia, 2 times per night, and incomplete emptying    Last AUA Symptom Score (QOL): 7 (2)  Today's AUA Symptom Score (QOL): 9 (3)    Summary of old records:   Urinary incontinence without sensory awareness: 0 mini-pads per day, 6/5/23 cysto/urodynamics: no obvious DO or MARIA L but external sphincter doesn't close; Gemtesa (vibegron) 75 mg po qd helped his frequency; saw PT; better with E-stim  History of prostate cancer s/p 2001 RRP   5/15/23 VD: IRX=586 mL, SXC=1268 mL/d, TV=8 voids/d, ULV=211 mL/void, Nx2.33, NPi=49%, NUP=72 mL/hr  Microhematuria, small on 10/18/23 UA   Frequency: better with Gemtesa 75 mg qd, too expensive, check GoodRx or switch to Solifenacin (Vesicare) 5 mg po qd 4/10/25    Additional History: none    Procedures Today: N/A    Urinalysis today:  Results for orders placed or performed in visit on 04/10/25   POCT Urinalysis No Micro (Auto)   Result Value Ref Range    Color, UA yellow     Clarity, UA clear     Glucose, UA POC neg mg/dL    Bilirubin, UA      Ketones, UA      Spec Grav, UA      Blood, UA POC small     pH, UA

## 2025-04-16 ENCOUNTER — HOSPITAL ENCOUNTER (OUTPATIENT)
Age: 80
Discharge: HOME | End: 2025-04-18
Attending: INTERNAL MEDICINE
Payer: MEDICARE

## 2025-04-16 DIAGNOSIS — R07.89 CHEST DISCOMFORT: ICD-10-CM

## 2025-04-16 DIAGNOSIS — I48.0 PAROXYSMAL ATRIAL FIBRILLATION (HCC): ICD-10-CM

## 2025-04-16 DIAGNOSIS — I10 PRIMARY HYPERTENSION: ICD-10-CM

## 2025-04-16 DIAGNOSIS — E78.5 DYSLIPIDEMIA: ICD-10-CM

## 2025-04-16 DIAGNOSIS — I20.9 ANGINA PECTORIS: ICD-10-CM

## 2025-04-16 DIAGNOSIS — R07.9 CHEST PAIN, UNSPECIFIED TYPE: ICD-10-CM

## 2025-04-16 LAB
ECHO AO ROOT DIAM: 3.3 CM
ECHO AV PEAK GRADIENT: 7 MMHG
ECHO AV PEAK VELOCITY: 1.3 M/S
ECHO EST RA PRESSURE: 3 MMHG
ECHO LA AREA 2C: 19.6 CM2
ECHO LA AREA 4C: 16.7 CM2
ECHO LA DIAMETER: 2.9 CM
ECHO LA MAJOR AXIS: 5.2 CM
ECHO LA MINOR AXIS: 5.5 CM
ECHO LA TO AORTIC ROOT RATIO: 0.88
ECHO LA VOL BP: 50 ML (ref 18–58)
ECHO LA VOL MOD A2C: 57 ML (ref 18–58)
ECHO LA VOL MOD A4C: 42 ML (ref 18–58)
ECHO LV E' LATERAL VELOCITY: 8.38 CM/S
ECHO LV E' SEPTAL VELOCITY: 6.31 CM/S
ECHO LV EDV A2C: 77 ML
ECHO LV EDV A4C: 95 ML
ECHO LV EF PHYSICIAN: 60 %
ECHO LV EJECTION FRACTION A2C: 58 %
ECHO LV EJECTION FRACTION A4C: 57 %
ECHO LV EJECTION FRACTION BIPLANE: 58 % (ref 55–100)
ECHO LV ESV A2C: 32 ML
ECHO LV ESV A4C: 41 ML
ECHO LV FRACTIONAL SHORTENING: 35 % (ref 28–44)
ECHO LV INTERNAL DIMENSION DIASTOLIC: 3.7 CM (ref 4.2–5.9)
ECHO LV INTERNAL DIMENSION SYSTOLIC: 2.4 CM
ECHO LV IVSD: 1.1 CM (ref 0.6–1)
ECHO LV MASS 2D: 129.3 G (ref 88–224)
ECHO LV POSTERIOR WALL DIASTOLIC: 1.1 CM (ref 0.6–1)
ECHO LV RELATIVE WALL THICKNESS RATIO: 0.59
ECHO LVOT AREA: 4.2 CM2
ECHO LVOT DIAM: 2.3 CM
ECHO MV A VELOCITY: 0.91 M/S
ECHO MV E DECELERATION TIME (DT): 183 MS
ECHO MV E VELOCITY: 0.82 M/S
ECHO MV E/A RATIO: 0.9
ECHO MV E/E' LATERAL: 9.79
ECHO MV E/E' RATIO (AVERAGED): 11.39
ECHO MV E/E' SEPTAL: 13
ECHO RIGHT VENTRICULAR SYSTOLIC PRESSURE (RVSP): 30 MMHG
ECHO TV REGURGITANT MAX VELOCITY: 2.61 M/S
ECHO TV REGURGITANT PEAK GRADIENT: 27 MMHG

## 2025-04-16 PROCEDURE — 93306 TTE W/DOPPLER COMPLETE: CPT

## 2025-04-16 PROCEDURE — 93306 TTE W/DOPPLER COMPLETE: CPT | Performed by: INTERNAL MEDICINE

## 2025-04-17 ENCOUNTER — HOSPITAL ENCOUNTER (OUTPATIENT)
Dept: NUCLEAR MEDICINE | Age: 80
Discharge: HOME | End: 2025-04-19
Attending: INTERNAL MEDICINE
Payer: MEDICARE

## 2025-04-17 ENCOUNTER — HOSPITAL ENCOUNTER (OUTPATIENT)
Age: 80
Discharge: HOME | End: 2025-04-19
Attending: INTERNAL MEDICINE
Payer: MEDICARE

## 2025-04-17 VITALS — HEART RATE: 69 BPM | SYSTOLIC BLOOD PRESSURE: 174 MMHG | DIASTOLIC BLOOD PRESSURE: 81 MMHG

## 2025-04-17 DIAGNOSIS — I20.9 ANGINA PECTORIS: ICD-10-CM

## 2025-04-17 LAB
NUC STRESS EJECTION FRACTION: 70 %
STRESS BASELINE DIAS BP: 81 MMHG
STRESS BASELINE HR: 70 BPM
STRESS BASELINE SYS BP: 174 MMHG
STRESS ESTIMATED WORKLOAD: 1 METS
STRESS EXERCISE DUR MIN: 1 MIN
STRESS EXERCISE DUR SEC: 0 SEC
STRESS PEAK DIAS BP: 81 MMHG
STRESS PEAK SYS BP: 174 MMHG
STRESS PERCENT HR ACHIEVED: 72 %
STRESS POST PEAK HR: 102 BPM
STRESS RATE PRESSURE PRODUCT: NORMAL BPM*MMHG
STRESS ST DEPRESSION: 0 MM
STRESS TARGET HR: 142 BPM
TID: 1.07

## 2025-04-17 PROCEDURE — 6360000002 HC RX W HCPCS: Performed by: INTERNAL MEDICINE

## 2025-04-17 PROCEDURE — 3430000000 HC RX DIAGNOSTIC RADIOPHARMACEUTICAL: Performed by: INTERNAL MEDICINE

## 2025-04-17 PROCEDURE — 78452 HT MUSCLE IMAGE SPECT MULT: CPT

## 2025-04-17 PROCEDURE — 2500000003 HC RX 250 WO HCPCS: Performed by: INTERNAL MEDICINE

## 2025-04-17 PROCEDURE — A9500 TC99M SESTAMIBI: HCPCS | Performed by: INTERNAL MEDICINE

## 2025-04-17 PROCEDURE — 93017 CV STRESS TEST TRACING ONLY: CPT

## 2025-04-17 RX ORDER — SODIUM CHLORIDE 9 MG/ML
500 INJECTION, SOLUTION INTRAVENOUS CONTINUOUS PRN
Status: ACTIVE | OUTPATIENT
Start: 2025-04-17 | End: 2025-04-17

## 2025-04-17 RX ORDER — METOPROLOL TARTRATE 1 MG/ML
5 INJECTION, SOLUTION INTRAVENOUS EVERY 5 MIN PRN
Status: ACTIVE | OUTPATIENT
Start: 2025-04-17 | End: 2025-04-17

## 2025-04-17 RX ORDER — AMINOPHYLLINE 25 MG/ML
50 INJECTION, SOLUTION INTRAVENOUS PRN
Status: ACTIVE | OUTPATIENT
Start: 2025-04-17 | End: 2025-04-17

## 2025-04-17 RX ORDER — TETRAKIS(2-METHOXYISOBUTYLISOCYANIDE)COPPER(I) TETRAFLUOROBORATE 1 MG/ML
15.1 INJECTION, POWDER, LYOPHILIZED, FOR SOLUTION INTRAVENOUS
Status: COMPLETED | OUTPATIENT
Start: 2025-04-17 | End: 2025-04-17

## 2025-04-17 RX ORDER — NITROGLYCERIN 0.4 MG/1
0.4 TABLET SUBLINGUAL EVERY 5 MIN PRN
Status: ACTIVE | OUTPATIENT
Start: 2025-04-17 | End: 2025-04-17

## 2025-04-17 RX ORDER — TETRAKIS(2-METHOXYISOBUTYLISOCYANIDE)COPPER(I) TETRAFLUOROBORATE 1 MG/ML
44.1 INJECTION, POWDER, LYOPHILIZED, FOR SOLUTION INTRAVENOUS
Status: COMPLETED | OUTPATIENT
Start: 2025-04-17 | End: 2025-04-17

## 2025-04-17 RX ORDER — REGADENOSON 0.08 MG/ML
0.4 INJECTION, SOLUTION INTRAVENOUS
Status: COMPLETED | OUTPATIENT
Start: 2025-04-17 | End: 2025-04-17

## 2025-04-17 RX ORDER — ATROPINE SULFATE 0.1 MG/ML
0.5 INJECTION INTRAVENOUS EVERY 5 MIN PRN
Status: ACTIVE | OUTPATIENT
Start: 2025-04-17 | End: 2025-04-17

## 2025-04-17 RX ORDER — SODIUM CHLORIDE 0.9 % (FLUSH) 0.9 %
5-40 SYRINGE (ML) INJECTION PRN
Status: ACTIVE | OUTPATIENT
Start: 2025-04-17 | End: 2025-04-17

## 2025-04-17 RX ADMIN — REGADENOSON 0.4 MG: 0.08 INJECTION, SOLUTION INTRAVENOUS at 08:17

## 2025-04-17 RX ADMIN — SODIUM CHLORIDE, PRESERVATIVE FREE 10 ML: 5 INJECTION INTRAVENOUS at 08:17

## 2025-04-17 RX ADMIN — Medication 44.1 MILLICURIE: at 08:18

## 2025-04-17 RX ADMIN — Medication 15.1 MILLICURIE: at 07:10

## 2025-04-17 NOTE — FLOWSHEET NOTE
Pt tolerated lexiscan without experiencing side effect, recovered on cart and taken to [facility] for further testing in nad

## 2025-05-21 ENCOUNTER — HOSPITAL ENCOUNTER (OUTPATIENT)
Facility: CLINIC | Age: 80
Discharge: HOME | End: 2025-05-21
Payer: MEDICARE

## 2025-05-21 DIAGNOSIS — R53.83 OTHER FATIGUE: ICD-10-CM

## 2025-05-21 LAB
ALBUMIN SERPL-MCNC: 4.3 G/DL (ref 3.5–5.2)
ALBUMIN/GLOB SERPL: 1.9 {RATIO} (ref 1–2.5)
ALP SERPL-CCNC: 66 U/L (ref 40–129)
ALT SERPL-CCNC: 20 U/L (ref 10–50)
ANION GAP SERPL CALCULATED.3IONS-SCNC: 11 MMOL/L (ref 9–16)
AST SERPL-CCNC: 30 U/L (ref 10–50)
BASOPHILS # BLD: <0.03 K/UL (ref 0–0.2)
BASOPHILS NFR BLD: 0 % (ref 0–2)
BILIRUB SERPL-MCNC: 0.9 MG/DL (ref 0–1.2)
BUN SERPL-MCNC: 18 MG/DL (ref 8–23)
CALCIUM SERPL-MCNC: 9.9 MG/DL (ref 8.6–10.4)
CHLORIDE SERPL-SCNC: 99 MMOL/L (ref 98–107)
CO2 SERPL-SCNC: 26 MMOL/L (ref 20–31)
CREAT SERPL-MCNC: 1.3 MG/DL (ref 0.7–1.2)
EOSINOPHIL # BLD: <0.03 K/UL (ref 0–0.44)
EOSINOPHILS RELATIVE PERCENT: 0 % (ref 1–4)
ERYTHROCYTE [DISTWIDTH] IN BLOOD BY AUTOMATED COUNT: 16.8 % (ref 11.8–14.4)
FOLATE SERPL-MCNC: 23.8 NG/ML (ref 4.8–24.2)
GFR, ESTIMATED: 56 ML/MIN/1.73M2
GLUCOSE P FAST SERPL-MCNC: 113 MG/DL (ref 74–99)
HCT VFR BLD AUTO: 41.2 % (ref 40.7–50.3)
HGB BLD-MCNC: 13.4 G/DL (ref 13–17)
IMM GRANULOCYTES # BLD AUTO: <0.03 K/UL (ref 0–0.3)
IMM GRANULOCYTES NFR BLD: 0 %
LYMPHOCYTES NFR BLD: 1.71 K/UL (ref 1.1–3.7)
LYMPHOCYTES RELATIVE PERCENT: 32 % (ref 24–43)
MCH RBC QN AUTO: 27.6 PG (ref 25.2–33.5)
MCHC RBC AUTO-ENTMCNC: 32.5 G/DL (ref 28.4–34.8)
MCV RBC AUTO: 84.9 FL (ref 82.6–102.9)
MONOCYTES NFR BLD: 0.43 K/UL (ref 0.1–1.2)
MONOCYTES NFR BLD: 8 % (ref 3–12)
NEUTROPHILS NFR BLD: 60 % (ref 36–65)
NEUTS SEG NFR BLD: 3.25 K/UL (ref 1.5–8.1)
NRBC BLD-RTO: 0 PER 100 WBC
PLATELET # BLD AUTO: 260 K/UL (ref 138–453)
PMV BLD AUTO: 9.4 FL (ref 8.1–13.5)
POTASSIUM SERPL-SCNC: 4.7 MMOL/L (ref 3.7–5.3)
PROT SERPL-MCNC: 6.6 G/DL (ref 6.6–8.7)
RBC # BLD AUTO: 4.85 M/UL (ref 4.21–5.77)
RBC # BLD: ABNORMAL 10*6/UL
SODIUM SERPL-SCNC: 136 MMOL/L (ref 136–145)
T4 FREE SERPL-MCNC: 1.1 NG/DL (ref 0.9–1.7)
TSH SERPL DL<=0.05 MIU/L-ACNC: 0.31 UIU/ML (ref 0.27–4.2)
VIT B12 SERPL-MCNC: 768 PG/ML (ref 232–1245)
WBC OTHER # BLD: 5.4 K/UL (ref 3.5–11.3)

## 2025-05-21 PROCEDURE — 80053 COMPREHEN METABOLIC PANEL: CPT

## 2025-05-21 PROCEDURE — 84439 ASSAY OF FREE THYROXINE: CPT

## 2025-05-21 PROCEDURE — 85025 COMPLETE CBC W/AUTO DIFF WBC: CPT

## 2025-05-21 PROCEDURE — 82746 ASSAY OF FOLIC ACID SERUM: CPT

## 2025-05-21 PROCEDURE — 36415 COLL VENOUS BLD VENIPUNCTURE: CPT

## 2025-05-21 PROCEDURE — 84443 ASSAY THYROID STIM HORMONE: CPT

## 2025-05-21 PROCEDURE — 82607 VITAMIN B-12: CPT

## 2025-05-22 ENCOUNTER — OFFICE VISIT (OUTPATIENT)
Age: 80
End: 2025-05-22
Payer: MEDICARE

## 2025-05-22 VITALS
SYSTOLIC BLOOD PRESSURE: 152 MMHG | WEIGHT: 157 LBS | BODY MASS INDEX: 23.25 KG/M2 | HEART RATE: 73 BPM | HEIGHT: 69 IN | OXYGEN SATURATION: 98 % | DIASTOLIC BLOOD PRESSURE: 80 MMHG

## 2025-05-22 DIAGNOSIS — I42.9 CARDIOMYOPATHY, UNSPECIFIED TYPE (HCC): ICD-10-CM

## 2025-05-22 DIAGNOSIS — I10 PRIMARY HYPERTENSION: ICD-10-CM

## 2025-05-22 DIAGNOSIS — E78.5 DYSLIPIDEMIA: ICD-10-CM

## 2025-05-22 DIAGNOSIS — I20.9 ANGINA PECTORIS: ICD-10-CM

## 2025-05-22 DIAGNOSIS — I48.0 PAROXYSMAL ATRIAL FIBRILLATION (HCC): Primary | ICD-10-CM

## 2025-05-22 PROCEDURE — 99215 OFFICE O/P EST HI 40 MIN: CPT | Performed by: INTERNAL MEDICINE

## 2025-05-22 PROCEDURE — G8427 DOCREV CUR MEDS BY ELIG CLIN: HCPCS | Performed by: INTERNAL MEDICINE

## 2025-05-22 PROCEDURE — G8420 CALC BMI NORM PARAMETERS: HCPCS | Performed by: INTERNAL MEDICINE

## 2025-05-22 PROCEDURE — 1036F TOBACCO NON-USER: CPT | Performed by: INTERNAL MEDICINE

## 2025-05-22 PROCEDURE — 3077F SYST BP >= 140 MM HG: CPT | Performed by: INTERNAL MEDICINE

## 2025-05-22 PROCEDURE — 1159F MED LIST DOCD IN RCRD: CPT | Performed by: INTERNAL MEDICINE

## 2025-05-22 PROCEDURE — 1160F RVW MEDS BY RX/DR IN RCRD: CPT | Performed by: INTERNAL MEDICINE

## 2025-05-22 PROCEDURE — 1123F ACP DISCUSS/DSCN MKR DOCD: CPT | Performed by: INTERNAL MEDICINE

## 2025-05-22 PROCEDURE — 3079F DIAST BP 80-89 MM HG: CPT | Performed by: INTERNAL MEDICINE

## 2025-05-22 NOTE — PROGRESS NOTES
cooperative with exam  HEENT: normocephalic and without obvious abnormality.  Neck: no carotid bruit, no JVD  Lungs: clear to auscultation bilaterally  Heart: regular rate and rhythm, S1, S2 normal, 2/6 systolic murmur  Abdomen: soft, non-tender; bowel sounds normal; no masses,  no organomegaly  Extremities: no site injection hematoma, no cyanosis, no edema  Neurologic: awake, alert, oriented, no obvious neurologic deficit  Skin: warm and dry    Labs:  CBC:   Lab Results   Component Value Date    WBC 5.4 05/21/2025    HGB 13.4 05/21/2025    HCT 41.2 05/21/2025    MCV 84.9 05/21/2025     05/21/2025      CMP:  Lab Results   Component Value Date     05/21/2025    K 4.7 05/21/2025    CL 99 05/21/2025    CO2 26 05/21/2025    BUN 18 05/21/2025    CREATININE 1.3 (H) 05/21/2025    GLUCOSE 157 (H) 03/14/2025    CALCIUM 9.9 05/21/2025    BILITOT 0.9 05/21/2025    ALKPHOS 66 05/21/2025    AST 30 05/21/2025    ALT 20 05/21/2025    LABGLOM 56 (L) 05/21/2025    GFRAA 81.5 04/14/2022     PT/INR: No results for input(s): \"PROTIME\", \"INR\" in the last 72 hours.  LIPID PANEL:  Lab Results   Component Value Date    CHOL 139 10/18/2023    TRIG 67 10/18/2023    HDL 63 10/18/2023    LDL 63 10/18/2023    VLDL 17 04/14/2022    CHOLHDLRATIO 2.2 10/18/2023      TSH:   Lab Results   Component Value Date    TSH 0.31 05/21/2025      A1C:   Hemoglobin A1C   Date Value Ref Range Status   10/18/2023 5.4 4.0 - 6.0 % Final        EKG 4/2/2025   Sinus rhythm 64 bpm with first-degree AV block, possible LVH  EKG 3/16/2025  Atrial fibrillation 85 bpm    Echocardiogram 4/16/2025    Normal left ventricular systolic function with EF by visual approximation 60%. Abnormal diastolic function.    Right ventricle size is normal. Normal systolic function.    Mild aortic regurgitation.   Trace tricuspid vegetation. RVSP 30 mmHg.    Lexiscan nuclear stress test 4/16/2025  No clear scintigraphic evidence for reversible ischemia or infarct.  Left

## 2025-08-20 ENCOUNTER — HOSPITAL ENCOUNTER (OUTPATIENT)
Dept: LAB | Facility: CLINIC | Age: 80
Discharge: HOME | End: 2025-08-20
Payer: MEDICARE

## 2025-08-20 DIAGNOSIS — R35.1 NOCTURIA: ICD-10-CM

## 2025-08-20 DIAGNOSIS — Z12.5 ENCOUNTER FOR SCREENING FOR MALIGNANT NEOPLASM OF PROSTATE: ICD-10-CM

## 2025-08-20 LAB — PSA SERPL-MCNC: <0.03 NG/ML (ref 0–4)

## 2025-08-20 PROCEDURE — G0103 PSA SCREENING: HCPCS

## 2025-08-20 PROCEDURE — 36415 COLL VENOUS BLD VENIPUNCTURE: CPT

## 2025-08-20 PROCEDURE — 84153 ASSAY OF PSA TOTAL: CPT
